# Patient Record
Sex: MALE | Race: BLACK OR AFRICAN AMERICAN | NOT HISPANIC OR LATINO | Employment: FULL TIME | URBAN - METROPOLITAN AREA
[De-identification: names, ages, dates, MRNs, and addresses within clinical notes are randomized per-mention and may not be internally consistent; named-entity substitution may affect disease eponyms.]

---

## 2018-12-02 ENCOUNTER — HOSPITAL ENCOUNTER (EMERGENCY)
Facility: OTHER | Age: 28
Discharge: HOME | End: 2018-12-02
Attending: EMERGENCY MEDICINE

## 2018-12-02 VITALS
RESPIRATION RATE: 16 BRPM | TEMPERATURE: 98 F | OXYGEN SATURATION: 99 % | WEIGHT: 157 LBS | DIASTOLIC BLOOD PRESSURE: 64 MMHG | BODY MASS INDEX: 21.98 KG/M2 | SYSTOLIC BLOOD PRESSURE: 123 MMHG | HEIGHT: 71 IN | HEART RATE: 77 BPM

## 2018-12-02 DIAGNOSIS — M79.644 FINGER PAIN, RIGHT: Primary | ICD-10-CM

## 2018-12-02 DIAGNOSIS — M79.643 HAND PAIN: ICD-10-CM

## 2018-12-02 PROCEDURE — 99283 EMERGENCY DEPT VISIT LOW MDM: CPT | Mod: 25

## 2018-12-02 PROCEDURE — 29130 APPL FINGER SPLINT STATIC: CPT | Mod: F8

## 2018-12-02 PROCEDURE — 25000003 PHARM REV CODE 250: Performed by: EMERGENCY MEDICINE

## 2018-12-02 RX ORDER — ACETAMINOPHEN 500 MG
1000 TABLET ORAL
Status: COMPLETED | OUTPATIENT
Start: 2018-12-02 | End: 2018-12-02

## 2018-12-02 RX ORDER — IBUPROFEN 600 MG/1
600 TABLET ORAL EVERY 6 HOURS PRN
Qty: 20 TABLET | Refills: 0 | Status: SHIPPED | OUTPATIENT
Start: 2018-12-02

## 2018-12-02 RX ADMIN — ACETAMINOPHEN 1000 MG: 500 TABLET, FILM COATED ORAL at 01:12

## 2018-12-02 NOTE — ED TRIAGE NOTES
Pt arrived to ED with c/o right ring finger swelling/tenderness x2 weeks. Pt denies recent trauma./injury to finger. Mild swelling noted. Pt c/o pain when bending finger.    Dr. Peng Tinoco

## 2018-12-02 NOTE — ED PROVIDER NOTES
Encounter Date: 12/2/2018    SCRIBE #1 NOTE: I, Alejoprakash Rodriguez, am scribing for, and in the presence of, Dr. Herrera.       History     Chief Complaint   Patient presents with    Hand Pain     to rt ring finger X 2 weeks, unsure of trauma     Time seen by provider: 1:40 AM    This is a 28 y.o. male who presents with complaint of pain to his right ring finger for two weeks. The patient reports he only had pain in the finger at onset, but the finger is now swollen. The patient reports icing the finger, and soaking it in Epson salt, but it did not alleviate pain or swelling. The patient denies taking any medication for the pain. He denies knowingly jamming his finger, but thinks it could have happened at work. 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         The history is provided by the patient.     Review of patient's allergies indicates:  No Known Allergies  History reviewed. No pertinent past medical history.  History reviewed. No pertinent surgical history.  No family history on file.  Social History     Tobacco Use    Smoking status: Current Every Day Smoker   Substance Use Topics    Alcohol use: No     Frequency: Never    Drug use: No     Review of Systems   Constitutional: Negative for fever.   HENT: Negative for sore throat.    Respiratory: Negative for shortness of breath.    Cardiovascular: Negative for chest pain.   Gastrointestinal: Negative for nausea.   Genitourinary: Negative for dysuria.   Musculoskeletal: Positive for joint swelling (Right ring finger.). Negative for back pain.        Positive for finger pain.   Skin: Negative for rash.   Neurological: Negative for weakness.   Hematological: Does not bruise/bleed easily.       Physical Exam     Initial Vitals [12/02/18 0109]   BP Pulse Resp Temp SpO2   123/64 77 16 98.2 °F (36.8 °C) 99 %      MAP       --         Physical Exam    Nursing note and vitals reviewed.  Constitutional: He appears well-developed and well-nourished.  "No distress.   HENT:   Head: Normocephalic and atraumatic.   Eyes: Conjunctivae and EOM are normal. Pupils are equal, round, and reactive to light.   Neck: Normal range of motion. Neck supple.   Pulmonary/Chest: Effort normal.   Abdominal: Normal appearance.   Musculoskeletal: Normal range of motion.   Hypertrophic callous present to right fourth digit at palmar aspect of PIP joint. Mild tenderness to joint space. No pain on passive flexion. No proximal streaking. No "sausage digit".   Neurological: He is alert and oriented to person, place, and time. He has normal strength. No cranial nerve deficit or sensory deficit.   Skin: Skin is warm and dry.   Psychiatric: He has a normal mood and affect. His behavior is normal. Thought content normal.         ED Course   Procedures  Labs Reviewed - No data to display       Imaging Results          X-Ray Finger 2 or More Views Right (Final result)  Result time 12/02/18 02:06:19    Final result by Catracho Allen MD (12/02/18 02:06:19)                 Impression:      No evidence of acute fracture or dislocation.  Soft tissue edema centered about the right ring finger PIP joint which could relate to synovitis.  Clinical correlation is advised.      Electronically signed by: Catracho Allen MD  Date:    12/02/2018  Time:    02:06             Narrative:    EXAMINATION:  XR FINGER 2 OR MORE VIEWS RIGHT    CLINICAL HISTORY:  ring finger pain;    TECHNIQUE:  AP, oblique, and lateral views of the right ring finger    COMPARISON:  None    FINDINGS:  There is no evidence of acute fracture.  No evidence of dislocation.  Joint spaces appear well maintained.  No evidence of aggressive cortical destruction or periosteal reaction.  There is soft tissue edema centered about the PIP joint of the right ring finger which could relate to underlying synovitis.  No radiographic evidence of subcutaneous emphysema or retained radiopaque foreign body.                              X-Rays: "   Independently Interpreted Readings:   Other Readings:  Right Hand- No obvious fracture, dislocations, or foreign body.    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 On exam pt has a large calluse to palmar aspect of PIP joint, mild tenderness to joint with no pain on passive flexion, no proximal streaking and without induration. Do not suspect flexor tenosynovitis or abscess at this time. Will obtain imaging to rule out fx/fb and reassess.  Independently Interpreted Test(s):   I have ordered and independently interpreted X-rays - see prior notes.  Clinical Tests:   Radiological Study: Ordered and Reviewed  ED Management:  Pt placed in splint for comfort by nursing, agrees to wound check, RICE and strict follow up precautions discussed. Post splint assessment performed intact distal NV exam.             Scribe Attestation:   Scribe #1: I performed the above scribed service and the documentation accurately describes the services I performed. I attest to the accuracy of the note.    Attending Attestation:           Physician Attestation for Scribe:  Physician Attestation Statement for Scribe #1: I, Dr. Herrera, reviewed documentation, as scribed by Tina Rodriguez in my presence, and it is both accurate and complete.                    Clinical Impression:     1. Finger pain, right    2. Hand pain            Disposition:   Disposition: Discharged  Condition: Fair                        Elizabeth Herrera MD  12/02/18 0657       Elizabeth Herrera MD  12/10/18 1535

## 2019-07-04 ENCOUNTER — HOSPITAL ENCOUNTER (EMERGENCY)
Facility: OTHER | Age: 29
Discharge: ANOTHER HOSPITAL | End: 2019-07-04
Attending: EMERGENCY MEDICINE

## 2019-07-04 ENCOUNTER — OFFICE VISIT (OUTPATIENT)
Dept: URGENT CARE | Facility: CLINIC | Age: 29
End: 2019-07-04
Payer: OTHER MISCELLANEOUS

## 2019-07-04 VITALS
RESPIRATION RATE: 15 BRPM | WEIGHT: 165 LBS | DIASTOLIC BLOOD PRESSURE: 97 MMHG | OXYGEN SATURATION: 98 % | BODY MASS INDEX: 23.62 KG/M2 | TEMPERATURE: 98 F | HEIGHT: 70 IN | HEART RATE: 72 BPM | SYSTOLIC BLOOD PRESSURE: 142 MMHG

## 2019-07-04 VITALS
OXYGEN SATURATION: 100 % | DIASTOLIC BLOOD PRESSURE: 75 MMHG | TEMPERATURE: 98 F | SYSTOLIC BLOOD PRESSURE: 129 MMHG | BODY MASS INDEX: 23.68 KG/M2 | RESPIRATION RATE: 15 BRPM | HEART RATE: 60 BPM | WEIGHT: 165 LBS

## 2019-07-04 DIAGNOSIS — S62.637B OPEN DISPLACED FRACTURE OF DISTAL PHALANX OF LEFT LITTLE FINGER, INITIAL ENCOUNTER: Primary | ICD-10-CM

## 2019-07-04 DIAGNOSIS — Z53.21 PATIENT LEFT WITHOUT BEING SEEN: Primary | ICD-10-CM

## 2019-07-04 PROCEDURE — 99499 NO LOS: ICD-10-PCS | Mod: S$GLB,,, | Performed by: EMERGENCY MEDICINE

## 2019-07-04 PROCEDURE — 96375 TX/PRO/DX INJ NEW DRUG ADDON: CPT

## 2019-07-04 PROCEDURE — 25000003 PHARM REV CODE 250: Performed by: EMERGENCY MEDICINE

## 2019-07-04 PROCEDURE — 96365 THER/PROPH/DIAG IV INF INIT: CPT

## 2019-07-04 PROCEDURE — 99285 EMERGENCY DEPT VISIT HI MDM: CPT

## 2019-07-04 PROCEDURE — 63600175 PHARM REV CODE 636 W HCPCS: Performed by: EMERGENCY MEDICINE

## 2019-07-04 PROCEDURE — 99499 UNLISTED E&M SERVICE: CPT | Mod: S$GLB,,, | Performed by: EMERGENCY MEDICINE

## 2019-07-04 RX ORDER — MORPHINE SULFATE 10 MG/ML
2 INJECTION INTRAMUSCULAR; INTRAVENOUS; SUBCUTANEOUS
Status: COMPLETED | OUTPATIENT
Start: 2019-07-04 | End: 2019-07-04

## 2019-07-04 RX ORDER — CEFAZOLIN SODIUM 1 G/50ML
1 SOLUTION INTRAVENOUS
Status: COMPLETED | OUTPATIENT
Start: 2019-07-04 | End: 2019-07-04

## 2019-07-04 RX ORDER — LIDOCAINE HYDROCHLORIDE 20 MG/ML
10 INJECTION, SOLUTION EPIDURAL; INFILTRATION; INTRACAUDAL; PERINEURAL
Status: COMPLETED | OUTPATIENT
Start: 2019-07-04 | End: 2019-07-04

## 2019-07-04 RX ORDER — OXYCODONE AND ACETAMINOPHEN 5; 325 MG/1; MG/1
1 TABLET ORAL
Status: COMPLETED | OUTPATIENT
Start: 2019-07-04 | End: 2019-07-04

## 2019-07-04 RX ORDER — LIDOCAINE HYDROCHLORIDE 20 MG/ML
10 INJECTION, SOLUTION INFILTRATION; PERINEURAL
Status: DISCONTINUED | OUTPATIENT
Start: 2019-07-04 | End: 2019-07-04 | Stop reason: SDUPTHER

## 2019-07-04 RX ADMIN — BACITRACIN ZINC, NEOMYCIN SULFATE, AND POLYMYXIN B SULFATE 1 EACH: 400; 3.5; 5 OINTMENT TOPICAL at 06:07

## 2019-07-04 RX ADMIN — MORPHINE SULFATE 2 MG: 10 INJECTION INTRAVENOUS at 06:07

## 2019-07-04 RX ADMIN — OXYCODONE HYDROCHLORIDE AND ACETAMINOPHEN 1 TABLET: 5; 325 TABLET ORAL at 06:07

## 2019-07-04 RX ADMIN — CEFAZOLIN SODIUM 1 G: 1 SOLUTION INTRAVENOUS at 09:07

## 2019-07-04 RX ADMIN — LIDOCAINE HYDROCHLORIDE 200 MG: 20 INJECTION, SOLUTION EPIDURAL; INFILTRATION; INTRACAUDAL; PERINEURAL at 08:07

## 2019-07-04 NOTE — ED NOTES
Appearance: Pt awake, alert & oriented to person, place & time. Pt in no acute distress at present time. Pt is clean and well groomed with clothes appropriately fastened.   Skin: Skin warm, dry. Color consistent with ethnicity. Mucous membranes moist. Pt with deep laceration to L fifth finger noted with moderate amount of bleeding without pressure dressing.   Musculoskeletal: Patient moving all extremities well.   Respiratory: Respirations spontaneous, even, and non-labored. Visible chest rise noted. Airway is open and patent. No accessory muscle use noted.   Neurologic: Sensation is intact. Speech is clear and appropriate. Eyes open spontaneously, behavior appropriate to situation, follows commands,  purposeful motor response noted.   Cardiac:  No Bilateral lower extremity edema. Cap refill is <3 seconds. Pt denies active chest pains, SOB, dizziness, blurred vision, weakness or fatigue at this time.

## 2019-07-04 NOTE — PROGRESS NOTES
Patient sent to ED. Laceration from kitchen slicer to left 5th digit, palmar surface, oblique, crossing middle and distal phalanx. Injury exposed bone and there appears to be tendinous injury and vessel injury. Patient being escorted by mother and father to ED.    This provider discussed the encounter with John Ribera of [facility], and informed the medical director, Dr. Tabares.

## 2019-07-04 NOTE — ED PROVIDER NOTES
Encounter Date: 7/4/2019    SCRIBE #1 NOTE: I, Janine Scott, am scribing for, and in the presence of,  Dr. Unger. I have scribed the entire note.       History     Chief Complaint   Patient presents with    Laceration     The patient states that he cutting up deli meat and hit the counter. He states that his left 5th finger was pushed into the slicer. The tip is lacerated and bleeding. Pressure dressing placed on the finger and buudy taped to the next finger.      Time seen by provider: 6:02 PM    This is a 29 y.o. male who presents with complaint of finger injury. Patient cut his fifth digit on a  at work this afternoon about 30 minutes prior to arrival. Patient says that he used several towels to stop the bleeding. Patient reports his last tetanus shot was given this year.     The history is provided by the patient.     Review of patient's allergies indicates:  No Known Allergies  History reviewed. No pertinent past medical history.  History reviewed. No pertinent surgical history.  History reviewed. No pertinent family history.  Social History     Tobacco Use    Smoking status: Current Every Day Smoker   Substance Use Topics    Alcohol use: No     Frequency: Never    Drug use: No     Review of Systems   Constitutional: Negative for chills and fever.   HENT: Negative for congestion, ear pain, rhinorrhea and sore throat.    Respiratory: Negative for cough, shortness of breath and wheezing.    Cardiovascular: Negative for chest pain and palpitations.   Gastrointestinal: Negative for abdominal pain, diarrhea, nausea and vomiting.   Genitourinary: Negative for dysuria and hematuria.   Musculoskeletal: Negative for back pain, myalgias and neck pain.   Skin: Positive for wound. Negative for rash.        Laceration to the fifth digit of the left hand.   Neurological: Negative for dizziness, weakness, light-headedness and headaches.   Psychiatric/Behavioral: Negative for confusion.       Physical Exam      Initial Vitals [07/04/19 1758]   BP Pulse Resp Temp SpO2   130/84 74 20 98.1 °F (36.7 °C) 100 %      MAP       --         Physical Exam    Nursing note and vitals reviewed.  Constitutional: He appears well-developed and well-nourished. He is not diaphoretic. No distress.   HENT:   Head: Normocephalic and atraumatic.   Mouth/Throat: Oropharynx is clear and moist.   Eyes: Conjunctivae and EOM are normal.   Neck: Normal range of motion. Neck supple.   Cardiovascular: Normal rate, regular rhythm and normal heart sounds. Exam reveals no gallop and no friction rub.    No murmur heard.  Left upper extremity radial pulses 2 +     Pulmonary/Chest: Breath sounds normal. He has no wheezes. He has no rhonchi. He has no rales.   Abdominal: Soft. He exhibits no mass. There is no tenderness. There is no rebound and no guarding.   Musculoskeletal: Normal range of motion. He exhibits no edema or tenderness.   Lymphadenopathy:     He has no cervical adenopathy.   Neurological: He is alert and oriented to person, place, and time. He has normal strength.   Skin: Skin is warm and dry. Capillary refill takes less than 2 seconds. No rash and no abscess noted.   Laceration to the left fifth digit extending dorsally from the DIP joint horizontally across the lateral portion of the nail and through the nail to the anterior portion of the tip of the finger, just distal to the PIP joint ending before the DIP joint.   Laceration length 6 cm.         ED Course   Lac Repair  Date/Time: 7/4/2019 6:20 PM  Performed by: Janine Scott  Authorized by: Catracho Unger MD         Labs Reviewed - No data to display       Imaging Results          X-Ray Finger 2 or More Views Left (Final result)  Result time 07/04/19 18:45:55    Final result by Pedro Padilla MD (07/04/19 18:45:55)                 Impression:      Acute fracture of the 5th finger distal phalanx.      Electronically signed by: Pedro Padilla  MD  Date:    07/04/2019  Time:    18:45             Narrative:    EXAMINATION:  XR FINGER 2 OR MORE VIEWS LEFT    CLINICAL HISTORY:  Laceration;    COMPARISON:  None    FINDINGS:  Acute displaced fracture seen at the proximal aspect of the 5th finger distal phalanx.  There is intra-articular extension to the DIP joint.  No evidence of additional acute fractures or dislocation.  Overlying gauze partially obscures evaluation.  No definite radiopaque retained foreign body seen.                                 Medical Decision Making:   Clinical Tests:   Radiological Study: Ordered and Reviewed              Attending Attestation:           Physician Attestation for Scribe:  Physician Attestation Statement for Scribe #1: I, Dr. Unger, reviewed documentation, as scribed by Janine Scott in my presence, and it is both accurate and complete.         Attending ED Notes:   Emergent evaluation of 29-year-old male with a laceration to his left 5th digit while using a deli .  Patient has decreased sensation, extensor tendon injury and found to have a open fracture of the distal phalanx of the 5th digit with intra-articular extension to the DIP joint.  No extensive bleeding.  No arterial bleeding.  Tetanus is up-to-date.  Patient is is administered Ancef IV.  Finger is anesthetized in a ring block fashion and washed out and then soaked in Betadine with normal saline.  Patient was accepted by [facility] orthopedic hand surgeon to whom I was unable to speak to.  Emergency medicine physician per transfer center also accepted patient.  I did not place any sutures in the patient's finger as patient will be going to surgery for washout and repair and patient is at high risk of infection.  The patient is extensively counseled on his diagnosis and treatment including all diagnostic and physical exam findings.  The patient is transferred in stable condition.          ED Course as of Jul 04 2108   Thu Jul 04, 2019   1922  Consulted Dr. Fraire who recommends calling a hand surgeon.      [MO]   2029 Finger is anesthestized with 2 cc 2 % lidocaine w/o epinephrine.   Extensive irrigation and finger soak in saline with Betadine.     [MO]   2105 Patient was accepted by orthopedic hand surgeon from [facility]. [facility]    [MO]      ED Course User Index  [MO] Janine Scott     Clinical Impression:     1. Open displaced fracture of distal phalanx of left little finger, initial encounter    2. Laceration of tendon                               Catracho Unger MD  07/04/19 2514

## 2019-07-04 NOTE — ED NOTES
"Pt to ED reporting he cut his L 5th finger while at work around 4 pm, while using "a salami cutter my finger accidentally went through it", UTD on tetanus. Bleeding controlled with pressure applied gauze. Pt AAOx4 and appropriate at this time. Respirations even and unlabored. No acute distress noted.      "

## 2019-07-04 NOTE — PROGRESS NOTES
"Subjective:       Patient ID: Doc Hernández is a 29 y.o. male.    Vitals:  height is 5' 10" (1.778 m) and weight is 74.8 kg (165 lb). His temperature is 97.6 °F (36.4 °C). His blood pressure is 142/97 (abnormal) and his pulse is 72. His respiration is 15 and oxygen saturation is 98%.     Chief Complaint: Laceration (left)    Patient was at work cutting meat. Patient sliced pinky with sharp knife.    Laceration    The incident occurred less than 1 hour ago. The laceration mechanism was a clean knife. The pain is at a severity of 10/10. The pain is severe. His tetanus status is UTD.       Constitution: Negative for fatigue.   HENT: Negative for facial swelling and facial trauma.    Neck: Negative for neck stiffness.   Cardiovascular: Negative for chest trauma.   Eyes: Negative for eye trauma, double vision and blurred vision.   Gastrointestinal: Negative for abdominal trauma, abdominal pain and rectal bleeding.   Genitourinary: Negative for hematuria, genital trauma and pelvic pain.   Musculoskeletal: Negative for pain, trauma, joint swelling, abnormal ROM of joint and pain with walking.   Skin: Positive for color change, wound and laceration. Negative for abrasion.   Neurological: Negative for dizziness, history of vertigo, light-headedness, coordination disturbances, altered mental status and loss of consciousness.   Hematologic/Lymphatic: Negative for history of bleeding disorder.   Psychiatric/Behavioral: Negative for altered mental status.       Objective:      Physical Exam    Assessment:       No diagnosis found.    Plan:         There are no diagnoses linked to this encounter.     "

## 2019-07-04 NOTE — ED NOTES
Guaze pressure Dressing to L 5th finger remains intact. Bleeding controlled at this time. MD notified.

## 2019-07-05 PROBLEM — S61.317A LACERATION OF LEFT LITTLE FINGER WITHOUT FOREIGN BODY WITH DAMAGE TO NAIL: Status: ACTIVE | Noted: 2019-07-05

## 2019-08-20 DIAGNOSIS — S61.317A: Primary | ICD-10-CM
